# Patient Record
Sex: MALE | Race: WHITE | Employment: FULL TIME | URBAN - METROPOLITAN AREA
[De-identification: names, ages, dates, MRNs, and addresses within clinical notes are randomized per-mention and may not be internally consistent; named-entity substitution may affect disease eponyms.]

---

## 2023-03-17 ENCOUNTER — TELEPHONE (OUTPATIENT)
Dept: FAMILY MEDICINE CLINIC | Facility: CLINIC | Age: 58
End: 2023-03-17

## 2023-03-17 NOTE — TELEPHONE ENCOUNTER
Spoke to patient and have him scheduled.      Future Appointments   Date Time Provider Parminder Jacobs   5/25/2023  8:00 AM DO SIRENA Santo BS AMB

## 2023-03-17 NOTE — TELEPHONE ENCOUNTER
----- Message from Rony Culp sent at 3/17/2023 11:16 AM EDT -----  Subject: Appointment Request    Reason for Call: New Patient/New to Provider Appointment needed: New   Patient Request Appointment    QUESTIONS    Reason for appointment request? Requested Provider unavailable - Cecille Ngo     Additional Information for Provider? pt would like to establish care with   Dr. Home Chahal. His wife Catracho Guzman is a pt and referred him. I am unable to   pull up her schedule in [facility].  Please call back to schedule  ---------------------------------------------------------------------------  --------------  Rabia Galeaon INFO  [phone]; OK to leave message on voicemail  ---------------------------------------------------------------------------  --------------  SCRIPT ANSWERS  COVID Screen: Alba Tamez

## 2023-05-24 SDOH — HEALTH STABILITY: PHYSICAL HEALTH: ON AVERAGE, HOW MANY DAYS PER WEEK DO YOU ENGAGE IN MODERATE TO STRENUOUS EXERCISE (LIKE A BRISK WALK)?: 0 DAYS

## 2023-05-24 SDOH — HEALTH STABILITY: PHYSICAL HEALTH: ON AVERAGE, HOW MANY MINUTES DO YOU ENGAGE IN EXERCISE AT THIS LEVEL?: 0 MIN

## 2023-05-25 ENCOUNTER — OFFICE VISIT (OUTPATIENT)
Dept: FAMILY MEDICINE CLINIC | Facility: CLINIC | Age: 58
End: 2023-05-25
Payer: COMMERCIAL

## 2023-05-25 VITALS
HEIGHT: 71 IN | WEIGHT: 219 LBS | BODY MASS INDEX: 30.66 KG/M2 | SYSTOLIC BLOOD PRESSURE: 143 MMHG | RESPIRATION RATE: 16 BRPM | TEMPERATURE: 98 F | DIASTOLIC BLOOD PRESSURE: 87 MMHG

## 2023-05-25 DIAGNOSIS — Z51.81 ENCOUNTER FOR MONITORING CHRONIC NSAID THERAPY: ICD-10-CM

## 2023-05-25 DIAGNOSIS — Z79.1 ENCOUNTER FOR MONITORING CHRONIC NSAID THERAPY: ICD-10-CM

## 2023-05-25 DIAGNOSIS — K22.719 BARRETT'S ESOPHAGUS WITH DYSPLASIA: ICD-10-CM

## 2023-05-25 DIAGNOSIS — Z12.2 SCREENING FOR LUNG CANCER: ICD-10-CM

## 2023-05-25 DIAGNOSIS — M25.50 POLYARTHRALGIA: ICD-10-CM

## 2023-05-25 DIAGNOSIS — I10 HTN, GOAL BELOW 130/80: ICD-10-CM

## 2023-05-25 DIAGNOSIS — Z77.090 ASBESTOS EXPOSURE: ICD-10-CM

## 2023-05-25 DIAGNOSIS — Z76.89 ESTABLISHING CARE WITH NEW DOCTOR, ENCOUNTER FOR: Primary | ICD-10-CM

## 2023-05-25 DIAGNOSIS — E78.5 DYSLIPIDEMIA, GOAL LDL BELOW 70: ICD-10-CM

## 2023-05-25 PROCEDURE — 99204 OFFICE O/P NEW MOD 45 MIN: CPT | Performed by: STUDENT IN AN ORGANIZED HEALTH CARE EDUCATION/TRAINING PROGRAM

## 2023-05-25 PROCEDURE — 3077F SYST BP >= 140 MM HG: CPT | Performed by: STUDENT IN AN ORGANIZED HEALTH CARE EDUCATION/TRAINING PROGRAM

## 2023-05-25 PROCEDURE — 3079F DIAST BP 80-89 MM HG: CPT | Performed by: STUDENT IN AN ORGANIZED HEALTH CARE EDUCATION/TRAINING PROGRAM

## 2023-05-25 RX ORDER — MELOXICAM 15 MG/1
15 TABLET ORAL DAILY PRN
Qty: 90 TABLET | Refills: 1 | Status: SHIPPED | OUTPATIENT
Start: 2023-05-25 | End: 2023-05-25 | Stop reason: SINTOL

## 2023-05-25 RX ORDER — GEMFIBROZIL 600 MG/1
600 TABLET, FILM COATED ORAL DAILY
COMMUNITY
End: 2023-05-25 | Stop reason: ALTCHOICE

## 2023-05-25 RX ORDER — MELOXICAM 15 MG/1
15 TABLET ORAL DAILY
COMMUNITY
Start: 2023-03-08 | End: 2023-05-25 | Stop reason: SDUPTHER

## 2023-05-25 RX ORDER — IBUPROFEN 800 MG/1
800 TABLET ORAL 2 TIMES DAILY PRN
Qty: 60 TABLET | Refills: 1 | Status: SHIPPED | OUTPATIENT
Start: 2023-05-25

## 2023-05-25 RX ORDER — ASPIRIN 81 MG/1
TABLET, COATED ORAL
COMMUNITY
Start: 2023-02-24 | End: 2023-05-25 | Stop reason: SDUPTHER

## 2023-05-25 RX ORDER — OMEPRAZOLE 20 MG/1
CAPSULE, DELAYED RELEASE ORAL DAILY
COMMUNITY
Start: 2023-03-08 | End: 2023-05-25 | Stop reason: SDUPTHER

## 2023-05-25 RX ORDER — ATORVASTATIN CALCIUM 40 MG/1
40 TABLET, FILM COATED ORAL DAILY
Qty: 90 TABLET | Refills: 3 | Status: SHIPPED | OUTPATIENT
Start: 2023-05-25

## 2023-05-25 RX ORDER — LOSARTAN POTASSIUM AND HYDROCHLOROTHIAZIDE 12.5; 1 MG/1; MG/1
1 TABLET ORAL DAILY
Qty: 90 TABLET | Refills: 3 | Status: SHIPPED | OUTPATIENT
Start: 2023-05-25

## 2023-05-25 RX ORDER — ASPIRIN 81 MG/1
81 TABLET, COATED ORAL DAILY
Qty: 90 TABLET | Refills: 3 | Status: SHIPPED | OUTPATIENT
Start: 2023-05-25

## 2023-05-25 RX ORDER — OMEPRAZOLE 20 MG/1
20 CAPSULE, DELAYED RELEASE ORAL DAILY
Qty: 90 CAPSULE | Refills: 3 | Status: SHIPPED | OUTPATIENT
Start: 2023-05-25

## 2023-05-25 RX ORDER — LOSARTAN POTASSIUM 100 MG/1
100 TABLET ORAL DAILY
COMMUNITY
Start: 2022-03-13 | End: 2023-05-25 | Stop reason: ALTCHOICE

## 2023-05-25 SDOH — ECONOMIC STABILITY: FOOD INSECURITY: WITHIN THE PAST 12 MONTHS, THE FOOD YOU BOUGHT JUST DIDN'T LAST AND YOU DIDN'T HAVE MONEY TO GET MORE.: NEVER TRUE

## 2023-05-25 SDOH — ECONOMIC STABILITY: FOOD INSECURITY: WITHIN THE PAST 12 MONTHS, YOU WORRIED THAT YOUR FOOD WOULD RUN OUT BEFORE YOU GOT MONEY TO BUY MORE.: NEVER TRUE

## 2023-05-25 SDOH — ECONOMIC STABILITY: INCOME INSECURITY: HOW HARD IS IT FOR YOU TO PAY FOR THE VERY BASICS LIKE FOOD, HOUSING, MEDICAL CARE, AND HEATING?: NOT VERY HARD

## 2023-05-25 SDOH — ECONOMIC STABILITY: HOUSING INSECURITY
IN THE LAST 12 MONTHS, WAS THERE A TIME WHEN YOU DID NOT HAVE A STEADY PLACE TO SLEEP OR SLEPT IN A SHELTER (INCLUDING NOW)?: NO

## 2023-05-25 ASSESSMENT — PATIENT HEALTH QUESTIONNAIRE - PHQ9
2. FEELING DOWN, DEPRESSED OR HOPELESS: 0
SUM OF ALL RESPONSES TO PHQ QUESTIONS 1-9: 0
1. LITTLE INTEREST OR PLEASURE IN DOING THINGS: 0
SUM OF ALL RESPONSES TO PHQ9 QUESTIONS 1 & 2: 0

## 2023-05-25 NOTE — PROGRESS NOTES
Flor Miller is a 61 y.o. male (: 1965) presenting to address:    Chief Complaint   Patient presents with    Establish Care       Vitals:    23 0756   BP: (!) 143/87   Resp: 16   Temp: 98 °F (36.7 °C)       Coordination of Care Questionaire:   1. \"Have you been to the ER, urgent care clinic since your last visit? Hospitalized since your last visit? \" No    2. \"Have you seen or consulted any other health care providers outside of the [address] [place] [address] since your last visit? \" No     3. For patients aged 39-70: Has the patient had a colonoscopy / FIT/ Cologuard? Yes - no Care Gap present      If the patient is female:    4. For patients aged 41-77: Has the patient had a mammogram within the past 2 years? NA - based on age or sex      11. For patients aged 21-65: Has the patient had a pap smear? NA - based on age or sex    Advanced Directive:   1. Do you have an Advanced Directive? No    2. Would you like information on Advanced Directives?  No
Standing Expiration Date:   5/25/2024     Scheduling Instructions:      Keysha    External Referral To Gastroenterology     Referral Priority:   Routine     Referral Type:   Eval and Treat     Referral Reason:   Specialty Services Required     Requested Specialty:   Gastroenterology     Number of Visits Requested:   1     Return in about 2 months (around 7/25/2023) for 30min, follow up with PCP. Subjective   Extensive review of medical history and medications completed to facilitate transfer of care. Current Outpatient Medications   Medication Instructions    Aspirin Low Dose 81 mg, Oral, DAILY    atorvastatin (LIPITOR) 40 mg, Oral, DAILY    ibuprofen (ADVIL;MOTRIN) 800 mg, Oral, 2 TIMES DAILY PRN, Take with food. Avoid long term regular use. OK to use WITH Tylenol. losartan-hydroCHLOROthiazide (HYZAAR) 100-12.5 MG per tablet 1 tablet, Oral, DAILY    omeprazole (PRILOSEC) 20 mg, Oral, DAILY, For Blake's      No Known Allergies   Objective    height is 5' 11\" (1.803 m) and weight is 219 lb (99.3 kg). His temporal temperature is 98 °F (36.7 °C). His blood pressure is 143/87 (abnormal). His respiration is 16. Physical Exam  Vitals and nursing note reviewed. Constitutional:       General: He is not in acute distress. Cardiovascular:      Rate and Rhythm: Normal rate. Pulmonary:      Effort: Pulmonary effort is normal. No respiratory distress. Neurological:      Mental Status: He is alert and oriented to person, place, and time. Gait: Gait normal.   Psychiatric:         Mood and Affect: Mood normal.         Behavior: Behavior normal.         Thought Content:  Thought content normal.         Judgment: Judgment normal.          Zeke Galaviz,   Family Medicine  05/25/2023

## 2023-07-04 NOTE — PROGRESS NOTES
Trinidad Walsh (: 1965) is a 60 y.o. male, ESTABLISHED patient, here for FOLLOW UP:    ICD-10-CM    1. Bronchitis  J40 methylPREDNISolone (MEDROL, TAMARA,) 4 MG tablet      2. Polyarthralgia  M25.50 ibuprofen (ADVIL;MOTRIN) 800 MG tablet      3. Olivares's esophagus with dysplasia  K22.719       4. HTN, goal below 130/80  I10       5. Dyslipidemia, goal LDL below 70  E78.5         Assessment   Plan   #HTN - Goal BP <130/80  Reports significant stressors today, believes contributing to increased BP  Has BP cuff at home and agreeable to monitor to determine if average at goal  Tolerating medications without notable AE, will continue regimen unchanged    BP Readings from Last 3 Encounters:   23 (!) 142/92   23 (!) 143/87     #HLD - Goal LDL < 70  Tolerating medications without notable AE, will continue regimen unchanged  Key CAD CHF Meds            losartan-hydroCHLOROthiazide (HYZAAR) 100-12.5 MG per tablet (Taking)    Sig - Route: Take 1 tablet by mouth daily Indications: blood pressure, kidney protection - Oral    atorvastatin (LIPITOR) 40 MG tablet (Taking)    Sig - Route: Take 1 tablet by mouth daily Indications: cholesterol, heart protection - Oral          ##Hx of Tobacco use and asbestos exposure   Quit smoking, 5 years ago  30+ years, 2 pack/day  Interested in continuing screening for Lung CA, low dose CT ordered 2023. Reviewed risk/benefit of monitoring. #Olivares's Esophagus with dysplasia  Reviewed significant concern with 15yr daily use of NSAID and goal to switch to Tylenol daily with Ibuprofen prn to reduce negative impact on GI   Established GI for monitoring with EGD          No orders of the defined types were placed in this encounter. Return in about 3 months (around 10/25/2023) for transfer of care, BP.       Subjective   See A/P     Current Outpatient Medications   Medication Instructions    Aspirin Low Dose 81 mg, Oral, DAILY    atorvastatin (LIPITOR) 40 mg, Oral, DAILY

## 2023-07-25 ENCOUNTER — OFFICE VISIT (OUTPATIENT)
Dept: FAMILY MEDICINE CLINIC | Facility: CLINIC | Age: 58
End: 2023-07-25
Payer: COMMERCIAL

## 2023-07-25 VITALS
OXYGEN SATURATION: 94 % | TEMPERATURE: 98 F | SYSTOLIC BLOOD PRESSURE: 142 MMHG | BODY MASS INDEX: 29.68 KG/M2 | RESPIRATION RATE: 16 BRPM | HEART RATE: 63 BPM | DIASTOLIC BLOOD PRESSURE: 92 MMHG | HEIGHT: 71 IN | WEIGHT: 212 LBS

## 2023-07-25 DIAGNOSIS — E78.5 DYSLIPIDEMIA, GOAL LDL BELOW 70: ICD-10-CM

## 2023-07-25 DIAGNOSIS — J40 BRONCHITIS: Primary | ICD-10-CM

## 2023-07-25 DIAGNOSIS — K22.719 BARRETT'S ESOPHAGUS WITH DYSPLASIA: ICD-10-CM

## 2023-07-25 DIAGNOSIS — M25.50 POLYARTHRALGIA: ICD-10-CM

## 2023-07-25 DIAGNOSIS — I10 HTN, GOAL BELOW 130/80: ICD-10-CM

## 2023-07-25 PROCEDURE — 3077F SYST BP >= 140 MM HG: CPT | Performed by: STUDENT IN AN ORGANIZED HEALTH CARE EDUCATION/TRAINING PROGRAM

## 2023-07-25 PROCEDURE — 99214 OFFICE O/P EST MOD 30 MIN: CPT | Performed by: STUDENT IN AN ORGANIZED HEALTH CARE EDUCATION/TRAINING PROGRAM

## 2023-07-25 PROCEDURE — 3080F DIAST BP >= 90 MM HG: CPT | Performed by: STUDENT IN AN ORGANIZED HEALTH CARE EDUCATION/TRAINING PROGRAM

## 2023-07-25 RX ORDER — METHYLPREDNISOLONE 4 MG/1
TABLET ORAL
Qty: 1 KIT | Refills: 0 | Status: SHIPPED | OUTPATIENT
Start: 2023-07-25

## 2023-07-25 RX ORDER — FAMOTIDINE 40 MG/1
40 TABLET, FILM COATED ORAL DAILY
COMMUNITY
Start: 2023-06-15

## 2023-07-25 RX ORDER — IBUPROFEN 800 MG/1
800 TABLET ORAL 2 TIMES DAILY PRN
Qty: 180 TABLET | Refills: 1 | Status: SHIPPED | OUTPATIENT
Start: 2023-07-25

## 2023-07-25 ASSESSMENT — PATIENT HEALTH QUESTIONNAIRE - PHQ9
SUM OF ALL RESPONSES TO PHQ QUESTIONS 1-9: 0
1. LITTLE INTEREST OR PLEASURE IN DOING THINGS: 0
SUM OF ALL RESPONSES TO PHQ QUESTIONS 1-9: 0
2. FEELING DOWN, DEPRESSED OR HOPELESS: 0
SUM OF ALL RESPONSES TO PHQ9 QUESTIONS 1 & 2: 0

## 2023-07-25 NOTE — PATIENT INSTRUCTIONS
Over the Counter Oral Pain Med Options:  First choice: You can use Tylenol (Acetaminophen) Extra Strength/Arthritis up to 1000mg three times daily as needed for pain. Alternative NSAID for those with GERD (heartburn, reflux), history of ulcers would be Ibuprofen due to lowest documented adverse GI effects. You can take up to 800mg three times daily as needed for pain. Caution with long term regular use of any NSAID (Ex. Advil, Aleve, Ibuprofen.) They can put strain on your kidney, be upsetting to the stomach, as well as contribute to increased blood pressure. Tylenol and NSAIDs work in different ways and can be safely used together if needed on occasion. Over the Counter Topical Pain Med Options:  Can purchase Aspercreme or Volteran gel 1% (also known as Diclofenac) at the pharmacy without a prescription. It is the same kind of medication as Ibuprofen (NSAID) which works well for inflammation without the side effects that come with oral version of the medication. It comes in a tube, usually 100g and can be used as needed for muscular or joint pain. Can also try patches that contain antiinflammatory medicine, such as Salonpas patches that are made of thin, stretchable fabric and contain three active ingredients that work together as a topical pain treatment: camphor )3.1%(?, menthol )6%(???????????? and methyl salicylate (28%.) A single patch is reportedly effective for 8 to 12 hours. To simplify the instructions for the steroid pack and to avoid taking near bedtime, follow these instructions: For each day take all the pills in the corresponding row at the same time in the morning until completed. Ex. On Day 1 take each pill in the first row (6 total) at the same time. On Day 2, take each pill in the second row (5 total) at the same time.        GERD vs Acid Reflux Overview     Gastroesophageal reflux, also called \"acid reflux,\" occurs when the stomach contents back up into the esophagus and/or

## 2023-09-11 DIAGNOSIS — Z12.2 SCREENING FOR LUNG CANCER: ICD-10-CM

## 2023-09-11 DIAGNOSIS — Z77.090 ASBESTOS EXPOSURE: ICD-10-CM
